# Patient Record
Sex: MALE | Race: WHITE | NOT HISPANIC OR LATINO | Employment: FULL TIME | URBAN - METROPOLITAN AREA
[De-identification: names, ages, dates, MRNs, and addresses within clinical notes are randomized per-mention and may not be internally consistent; named-entity substitution may affect disease eponyms.]

---

## 2023-07-17 ENCOUNTER — OFFICE VISIT (OUTPATIENT)
Dept: URGENT CARE | Facility: CLINIC | Age: 28
End: 2023-07-17
Payer: COMMERCIAL

## 2023-07-17 VITALS
WEIGHT: 170 LBS | BODY MASS INDEX: 24.34 KG/M2 | TEMPERATURE: 96.8 F | HEIGHT: 70 IN | RESPIRATION RATE: 18 BRPM | HEART RATE: 61 BPM | OXYGEN SATURATION: 98 %

## 2023-07-17 DIAGNOSIS — J02.9 SORE THROAT: Primary | ICD-10-CM

## 2023-07-17 LAB — S PYO AG THROAT QL: NEGATIVE

## 2023-07-17 PROCEDURE — 99213 OFFICE O/P EST LOW 20 MIN: CPT | Performed by: PHYSICIAN ASSISTANT

## 2023-07-17 PROCEDURE — 87880 STREP A ASSAY W/OPTIC: CPT | Performed by: PHYSICIAN ASSISTANT

## 2023-07-17 RX ORDER — TRAZODONE HYDROCHLORIDE 50 MG/1
1 TABLET ORAL
COMMUNITY
Start: 2023-02-14

## 2023-07-17 RX ORDER — SERTRALINE HYDROCHLORIDE 100 MG/1
TABLET, FILM COATED ORAL
COMMUNITY
Start: 2023-06-26

## 2023-07-17 NOTE — PROGRESS NOTES
[facility]        NAME: Bette Urban is a 28 y.o. male  : 1995    MRN: [id number]  DATE: 2023  TIME: 9:48 AM    Assessment and Plan   Sore throat [J02.9]  1. Sore throat  POCT rapid strepA    Throat culture            Patient Instructions   Patient Instructions   I will call you if the throat culture is positive. Follow up with PCP in 3-5 days. Proceed to  ER if symptoms worsen. Chief Complaint     Chief Complaint   Patient presents with   • Sore Throat     Began Friday with sore throat, nausea, vomiting, fever 100.4         History of Present Illness       Bharat Chaudhary is a 79-year-old man presenting for sore throat, nausea vomiting, and fever x4 days. He states that his sore throat was his first symptom and began on Friday. On Saturday when he woke up he had several episodes of vomiting each occurred about 30 minutes apart. He also started with a low-grade fever on Saturday morning. He said his highest fever was 100.4 but he was taking Tylenol around-the-clock. He denies taking any other medication for his symptoms. He denies any trouble swallowing, and is still able to eat and drink. On  he no longer had any nausea or vomiting but felt fatigued and the sore throat remained. Today he is requesting a doctor's note from work. He denies any other URI symptoms, abdominal pain, bowel changes, trouble breathing, or chest pain. He does not have any chronic conditions and takes sertraline and trazodone daily as his only medications. He denies any allergies to medications. Strep test completed in office was negative, throat looks erythematous but there are no exudates or petechiae observed. Review of Systems   Review of Systems   Constitutional: Positive for fever. Negative for activity change, appetite change, chills and diaphoresis. HENT: Positive for sore throat. Negative for congestion, ear pain and rhinorrhea.     Eyes: Negative for pain and visual disturbance. Respiratory: Negative for cough, chest tightness and shortness of breath. Cardiovascular: Negative for chest pain and palpitations. Gastrointestinal: Positive for nausea and vomiting. Negative for abdominal pain and diarrhea. Genitourinary: Negative for dysuria and hematuria. Musculoskeletal: Negative for arthralgias, back pain and myalgias. Skin: Negative for color change, pallor and rash. Neurological: Negative for seizures, syncope and headaches. All other systems reviewed and are negative. Current Medications       Current Outpatient Medications:   •  sertraline (ZOLOFT) 100 mg tablet, , Disp: , Rfl:   •  traZODone (DESYREL) 50 mg tablet, Take 1 tablet by mouth daily at bedtime, Disp: , Rfl:     Current Allergies     Allergies as of 07/17/2023   • (No Known Allergies)            The following portions of the patient's history were reviewed and updated as appropriate: allergies, current medications, past family history, past medical history, past social history, past surgical history and problem list.     No past medical history on file. No past surgical history on file. No family history on file. Medications have been verified. Objective   Pulse 61   Temp (!) 96.8 °F (36 °C)   Resp 18   Ht 5' 10" (1.778 m)   Wt 77.1 kg (170 lb)   SpO2 98%   BMI 24.39 kg/m²        Physical Exam     Physical Exam  Vitals and nursing note reviewed. Constitutional:       General: He is not in acute distress. Appearance: Normal appearance. He is normal weight. He is not ill-appearing, toxic-appearing or diaphoretic. HENT:      Head: Normocephalic and atraumatic. Right Ear: Tympanic membrane and ear canal normal.      Left Ear: Tympanic membrane and ear canal normal.      Nose: Nose normal. No congestion or rhinorrhea. Mouth/Throat:      Mouth: Mucous membranes are moist. No oral lesions. Pharynx: Oropharynx is clear. Uvula midline.  Posterior oropharyngeal erythema present. No pharyngeal swelling, oropharyngeal exudate or uvula swelling. Cardiovascular:      Rate and Rhythm: Normal rate and regular rhythm. Heart sounds: Normal heart sounds. No murmur heard. No friction rub. No gallop. Pulmonary:      Effort: Pulmonary effort is normal. No respiratory distress. Breath sounds: Normal breath sounds. No stridor. No wheezing, rhonchi or rales. Chest:      Chest wall: No tenderness. Abdominal:      General: Abdomen is flat. Bowel sounds are normal. There is no distension. Palpations: Abdomen is soft. There is no mass. Tenderness: There is no abdominal tenderness. There is no guarding or rebound. Hernia: No hernia is present. Musculoskeletal:      Cervical back: Normal range of motion. Lymphadenopathy:      Cervical: No cervical adenopathy. Skin:     General: Skin is warm and dry. Capillary Refill: Capillary refill takes less than 2 seconds. Neurological:      Mental Status: He is alert.

## 2023-07-17 NOTE — LETTER
July 17, 2023     Patient: Gustabo Rg  YOB: 1995  Date of Visit: 7/17/2023      To Whom it May Concern:    Gustabo Rg is under my professional care. Ariane Carolin was seen in my office on 7/17/2023. Ariane Coe may return to work on 7/20/23. If you have any questions or concerns, please don't hesitate to call.          Sincerely,          Nicholas Leggett PA-C        CC: No Recipients

## 2023-07-20 LAB — B-HEM STREP SPEC QL CULT: NEGATIVE
